# Patient Record
Sex: MALE | Race: WHITE | Employment: OTHER | URBAN - METROPOLITAN AREA
[De-identification: names, ages, dates, MRNs, and addresses within clinical notes are randomized per-mention and may not be internally consistent; named-entity substitution may affect disease eponyms.]

---

## 2017-05-18 ENCOUNTER — HOSPITAL ENCOUNTER (OUTPATIENT)
Dept: GENERAL RADIOLOGY | Age: 77
Discharge: HOME | End: 2017-05-18
Attending: ALLERGY & IMMUNOLOGY
Payer: MEDICARE

## 2017-05-18 DIAGNOSIS — R05.3 CHRONIC COUGH: ICD-10-CM

## 2017-05-18 PROCEDURE — 71020 XR CHEST PA LAT: CPT

## 2017-08-09 ENCOUNTER — ANESTHESIA (OUTPATIENT)
Dept: ENDOSCOPY | Age: 77
End: 2017-08-09
Payer: MEDICARE

## 2017-08-09 ENCOUNTER — ANESTHESIA EVENT (OUTPATIENT)
Dept: ENDOSCOPY | Age: 77
End: 2017-08-09
Payer: MEDICARE

## 2017-08-09 ENCOUNTER — HOSPITAL ENCOUNTER (OUTPATIENT)
Age: 77
Setting detail: OUTPATIENT SURGERY
Discharge: HOME | End: 2017-08-09
Attending: SPECIALIST | Admitting: SPECIALIST
Payer: MEDICARE

## 2017-08-09 VITALS
RESPIRATION RATE: 19 BRPM | OXYGEN SATURATION: 96 % | BODY MASS INDEX: 27.76 KG/M2 | WEIGHT: 198.31 LBS | HEART RATE: 69 BPM | TEMPERATURE: 97.7 F | DIASTOLIC BLOOD PRESSURE: 73 MMHG | SYSTOLIC BLOOD PRESSURE: 166 MMHG | HEIGHT: 71 IN

## 2017-08-09 LAB
H PYLORI FROM TISSUE: NEGATIVE
KIT LOT NO., HCLOLOT: NORMAL
NEGATIVE CONTROL: NEGATIVE
POSITIVE CONTROL: POSITIVE

## 2017-08-09 PROCEDURE — 87077 CULTURE AEROBIC IDENTIFY: CPT | Performed by: SPECIALIST

## 2017-08-09 PROCEDURE — 74011000250 HC RX REV CODE- 250

## 2017-08-09 PROCEDURE — 88312 SPECIAL STAINS GROUP 1: CPT | Performed by: SPECIALIST

## 2017-08-09 PROCEDURE — 76040000019: Performed by: SPECIALIST

## 2017-08-09 PROCEDURE — 76060000031 HC ANESTHESIA FIRST 0.5 HR: Performed by: SPECIALIST

## 2017-08-09 PROCEDURE — 74011250636 HC RX REV CODE- 250/636

## 2017-08-09 PROCEDURE — 88342 IMHCHEM/IMCYTCHM 1ST ANTB: CPT | Performed by: SPECIALIST

## 2017-08-09 PROCEDURE — 88305 TISSUE EXAM BY PATHOLOGIST: CPT | Performed by: SPECIALIST

## 2017-08-09 PROCEDURE — C1726 CATH, BAL DIL, NON-VASCULAR: HCPCS | Performed by: SPECIALIST

## 2017-08-09 PROCEDURE — 77030018712 HC DEV BLLN INFL BSC -B: Performed by: SPECIALIST

## 2017-08-09 PROCEDURE — 77030009426 HC FCPS BIOP ENDOSC BSC -B: Performed by: SPECIALIST

## 2017-08-09 RX ORDER — SODIUM CHLORIDE 0.9 % (FLUSH) 0.9 %
5-10 SYRINGE (ML) INJECTION AS NEEDED
Status: DISCONTINUED | OUTPATIENT
Start: 2017-08-09 | End: 2017-08-09 | Stop reason: HOSPADM

## 2017-08-09 RX ORDER — SODIUM CHLORIDE 0.9 % (FLUSH) 0.9 %
5-10 SYRINGE (ML) INJECTION EVERY 8 HOURS
Status: DISCONTINUED | OUTPATIENT
Start: 2017-08-09 | End: 2017-08-09 | Stop reason: HOSPADM

## 2017-08-09 RX ORDER — SODIUM CHLORIDE 9 MG/ML
50 INJECTION, SOLUTION INTRAVENOUS CONTINUOUS
Status: DISCONTINUED | OUTPATIENT
Start: 2017-08-09 | End: 2017-08-09 | Stop reason: HOSPADM

## 2017-08-09 RX ORDER — PROPOFOL 10 MG/ML
INJECTION, EMULSION INTRAVENOUS AS NEEDED
Status: DISCONTINUED | OUTPATIENT
Start: 2017-08-09 | End: 2017-08-09 | Stop reason: HOSPADM

## 2017-08-09 RX ORDER — FLUMAZENIL 0.1 MG/ML
0.2 INJECTION INTRAVENOUS
Status: DISCONTINUED | OUTPATIENT
Start: 2017-08-09 | End: 2017-08-09 | Stop reason: HOSPADM

## 2017-08-09 RX ORDER — NALOXONE HYDROCHLORIDE 0.4 MG/ML
0.4 INJECTION, SOLUTION INTRAMUSCULAR; INTRAVENOUS; SUBCUTANEOUS
Status: DISCONTINUED | OUTPATIENT
Start: 2017-08-09 | End: 2017-08-09 | Stop reason: HOSPADM

## 2017-08-09 RX ORDER — SODIUM CHLORIDE 9 MG/ML
INJECTION, SOLUTION INTRAVENOUS
Status: DISCONTINUED | OUTPATIENT
Start: 2017-08-09 | End: 2017-08-09 | Stop reason: HOSPADM

## 2017-08-09 RX ORDER — LIDOCAINE HYDROCHLORIDE 20 MG/ML
INJECTION, SOLUTION EPIDURAL; INFILTRATION; INTRACAUDAL; PERINEURAL AS NEEDED
Status: DISCONTINUED | OUTPATIENT
Start: 2017-08-09 | End: 2017-08-09 | Stop reason: HOSPADM

## 2017-08-09 RX ORDER — MIDAZOLAM HYDROCHLORIDE 1 MG/ML
.25-1 INJECTION, SOLUTION INTRAMUSCULAR; INTRAVENOUS
Status: DISCONTINUED | OUTPATIENT
Start: 2017-08-09 | End: 2017-08-09 | Stop reason: HOSPADM

## 2017-08-09 RX ORDER — EPINEPHRINE 0.1 MG/ML
1 INJECTION INTRACARDIAC; INTRAVENOUS
Status: DISCONTINUED | OUTPATIENT
Start: 2017-08-09 | End: 2017-08-09 | Stop reason: HOSPADM

## 2017-08-09 RX ORDER — LORAZEPAM 2 MG/ML
2 INJECTION INTRAMUSCULAR AS NEEDED
Status: DISCONTINUED | OUTPATIENT
Start: 2017-08-09 | End: 2017-08-09 | Stop reason: HOSPADM

## 2017-08-09 RX ORDER — ATROPINE SULFATE 0.1 MG/ML
0.5 INJECTION INTRAVENOUS
Status: DISCONTINUED | OUTPATIENT
Start: 2017-08-09 | End: 2017-08-09 | Stop reason: HOSPADM

## 2017-08-09 RX ORDER — FENTANYL CITRATE 50 UG/ML
200 INJECTION, SOLUTION INTRAMUSCULAR; INTRAVENOUS
Status: DISCONTINUED | OUTPATIENT
Start: 2017-08-09 | End: 2017-08-09 | Stop reason: HOSPADM

## 2017-08-09 RX ORDER — DEXTROMETHORPHAN/PSEUDOEPHED 2.5-7.5/.8
1.2 DROPS ORAL
Status: DISCONTINUED | OUTPATIENT
Start: 2017-08-09 | End: 2017-08-09 | Stop reason: HOSPADM

## 2017-08-09 RX ADMIN — PROPOFOL 30 MG: 10 INJECTION, EMULSION INTRAVENOUS at 07:53

## 2017-08-09 RX ADMIN — PROPOFOL 50 MG: 10 INJECTION, EMULSION INTRAVENOUS at 07:42

## 2017-08-09 RX ADMIN — PROPOFOL 40 MG: 10 INJECTION, EMULSION INTRAVENOUS at 07:56

## 2017-08-09 RX ADMIN — LIDOCAINE HYDROCHLORIDE 20 MG: 20 INJECTION, SOLUTION EPIDURAL; INFILTRATION; INTRACAUDAL; PERINEURAL at 07:53

## 2017-08-09 RX ADMIN — PROPOFOL 50 MG: 10 INJECTION, EMULSION INTRAVENOUS at 07:51

## 2017-08-09 RX ADMIN — SODIUM CHLORIDE: 9 INJECTION, SOLUTION INTRAVENOUS at 07:30

## 2017-08-09 RX ADMIN — PROPOFOL 50 MG: 10 INJECTION, EMULSION INTRAVENOUS at 07:47

## 2017-08-09 RX ADMIN — PROPOFOL 100 MG: 10 INJECTION, EMULSION INTRAVENOUS at 07:39

## 2017-08-09 NOTE — IP AVS SNAPSHOT
[address] 
[phone] Patient: Ze Iqbal MRN: [id number] [facility]:6/58/2122 You are allergic to the following Allergen Reactions Pseudoephedrine Unknown (comments) Pt unaware of this. Recent Documentation Height Weight BMI Smoking Status 1.803 m 90 kg 27.66 kg/m2 Never Smoker Emergency Contacts Name Discharge Info Relation Home Work Mobile Madhavi Blakely DISCHARGE CAREGIVER [3] Spouse [3] [phone] About your hospitalization You were admitted on:  August 9, 2017 You last received care in the:  [facility] ENDOSCOPY You were discharged on:  August 9, 2017 Unit phone number:  [phone] Why you were hospitalized Your primary diagnosis was:  Not on File Providers Seen During Your Hospitalizations Provider Role Specialty Primary office phone Brice Farrell MD Attending Provider Gastroenterology [phone] Your Primary Care Physician (PCP) Primary Care Physician Office Phone Office Fax [facility] [phone] Follow-up Information None Your Appointments Tuesday August 15, 2017 10:30 AM EDT  
(Arrive by 10:00 AM) ECHO with ECHO LAB 1 [facility] NON-INVASIVE CARD ([address]) [address]  
[phone] Please be prepared to remove everything from the waist up and put on a gown. Please report to [facility] main lobby to [place] at [address] [address], [address]. Please arrive 30 mintues prior to appointment unless appointment prep instructions indicate otherwise. Please fax any paper scripts or orders to [phone]. Current Discharge Medication List  
  
CONTINUE these medications which have NOT CHANGED Dose & Instructions Dispensing Information Comments Morning Noon Evening Bedtime ADVAIR DISKUS 250-50 mcg/dose diskus inhaler Generic drug:  fluticasone-salmeterol Your last dose was: Your next dose is:    
   
   
 Dose:  1 Puff Take 1 Puff by inhalation two (2) times a day. Refills:  0  
     
   
   
   
  
 betamethasone dipropionate 0.05 % ointment Commonly known as:  Ramires Bernard Your last dose was: Your next dose is:    
   
   
 Apply  to affected area as needed for Skin Irritation. Refills:  0 CARDIZEM  mg ER capsule Generic drug:  dilTIAZem CD Your last dose was: Your next dose is:    
   
   
 Dose:  180 mg Take 180 mg by mouth daily. Refills:  0 CeleBREX 200 mg capsule Generic drug:  celecoxib Your last dose was: Your next dose is:    
   
   
 Dose:  200 mg Take 200 mg by mouth daily. Refills:  0  
     
   
   
   
  
 hydroCHLOROthiazide 25 mg tablet Commonly known as:  HYDRODIURIL Your last dose was: Your next dose is:    
   
   
 Dose:  25 mg Take 1 Tab by mouth daily. Quantity:  90 Tab Refills:  3 MULTIVITAMIN PO Your last dose was: Your next dose is: Take  by mouth. Takes one po daily. Refills:  0 OMEGA 3 FISH OIL PO Your last dose was: Your next dose is: Take  by mouth. Takes one po daily. Refills:  0 Discharge Instructions Evans Mullen [id number] 1940 EGD DISCHARGE INSTRUCTIONS Discomfort: 
Sore throat- throat lozenges or warm salt water gargle 
redness at IV site- apply warm compress to area; if redness or soreness persist- contact your physician Gaseous discomfort- walking, belching will help relieve any discomfort You may not operate a vehicle for 12 hours You may not engage in an occupation involving machinery or appliances for rest of today. You may not drink alcoholic beverages for at least 12 hours Avoid making any critical decisions for at least 24 hour DIET You may resume your regular diet  however -  remember your colon is empty and a heavy meal will produce gas. Avoid these foods:  vegetables, fried / greasy foods, carbonated drinks ACTIVITY You may resume your normal daily activities. Spend the remainder of the day resting -  avoid any strenuous activity. CALL M.D. ANY SIGN OF Increasing pain, nausea, vomiting Abdominal distension (swelling) New increased bleeding (oral or rectal) Fever (chills) Pain in chest area Bloody discharge from nose or mouth Shortness of breath You may not take any Advil, Aspirin, Ibuprofen, Motrin, Aleve, or Goodys unless MD recommended, ONLY  Tylenol as needed for pain. Follow-up Instructions: 
 Call Dr. Adolph Elena office to make appointment for follow up in 2 months Office telephone for problems or questions [phone] Results of procedure / biopsy in 10-14 days Start omeprazole sent into pharmacy Discharge Orders None Introducing [facility]! [employer] introduces [employer] patient portal. Now you can access parts of your medical record, email your doctor's office, and request medication refills online. 1. In your internet browser, go to [email or web address] 2. Click on the First Time User? Click Here link in the Sign In box. You will see the New Member Sign Up page. 3. Enter your [employer] Access Code exactly as it appears below. You will not need to use this code after youve completed the sign-up process. If you do not sign up before the expiration date, you must request a new code. · [employer] Access Code: [email or web address] Expires: 11/7/2017  6:29 AM 
 
4.  Enter the last four digits of your Social Security Number (xxxx) and Date of Birth (mm/dd/yyyy) as indicated and click Submit. You will be taken to the next sign-up page. 5. Create a [employer] ID. This will be your [employer] login ID and cannot be changed, so think of one that is secure and easy to remember. 6. Create a [employer] password. You can change your password at any time. 7. Enter your Password Reset Question and Answer. This can be used at a later time if you forget your password. 8. Enter your e-mail address. You will receive e-mail notification when new information is available in [address] 9. Click Sign Up. You can now view and download portions of your medical record. 10. Click the Download Summary menu link to download a portable copy of your medical information. If you have questions, please visit the Frequently Asked Questions section of the [employer] website. Remember, [employer] is NOT to be used for urgent needs. For medical emergencies, dial 911. Now available from your iPhone and Android! General Information Please provide this summary of care documentation to your next provider. Patient Signature:  ____________________________________________________________ Date:  ____________________________________________________________  
  
[facility] Provider Signature:  ____________________________________________________________ Date:  ____________________________________________________________

## 2017-08-09 NOTE — ANESTHESIA PREPROCEDURE EVALUATION
Anesthetic History   No history of anesthetic complications            Review of Systems / Medical History  Patient summary reviewed, nursing notes reviewed and pertinent labs reviewed    Pulmonary  Within defined limits          Asthma        Neuro/Psych   Within defined limits           Cardiovascular  Within defined limits  Hypertension                   GI/Hepatic/Renal  Within defined limits         Liver disease     Endo/Other  Within defined limits           Other Findings              Physical Exam    Airway  Mallampati: II  TM Distance: > 6 cm  Neck ROM: normal range of motion   Mouth opening: Normal     Cardiovascular  Regular rate and rhythm,  S1 and S2 normal,  no murmur, click, rub, or gallop             Dental    Dentition: Caps/crowns     Pulmonary  Breath sounds clear to auscultation               Abdominal  GI exam deferred       Other Findings            Anesthetic Plan    ASA: 2  Anesthesia type: MAC          Induction: Intravenous  Anesthetic plan and risks discussed with: Patient

## 2017-08-09 NOTE — ROUTINE PROCESS
Teodora Saint Joseph  1940  [id number]    Situation:  Verbal report received from:  Doug Ochoa RN  Procedure: Procedure(s):  ESOPHAGOGASTRODUODENOSCOPY (EGD) WITH BOTOX  ESOPHAGEAL DILATION  ESOPHAGOGASTRODUODENAL (EGD) BIOPSY    Background:    Preoperative diagnosis: ASTHMA, DYSPHAGIA  Postoperative diagnosis: Erosive esophagitis  Hemorragic Gastritis    :  Dr. Tena Reed  Assistant(s): Endoscopy Technician-1: Rashida James  Endoscopy RN-1: Jose Haas RN    Specimens:   ID Type Source Tests Collected by Time Destination   1 : antrum Preservative Gastric  Cecelia Frank MD 8/9/2017 5490 Pathology   2 : body Preservative Gastric  Cecelia Frank MD 8/9/2017 0745 Pathology   3 : ge junction Preservative GE Junction  Cecelia Frank MD 8/9/2017 1428 Pathology   4 : Lower esophagys Preservative Esophagus, Distal  Cecelia Frank MD 8/9/2017 2130 Pathology   5 : mid esophagus Preservative Esophagus, Mid  Cecelia Frank MD 8/9/2017 7977 Pathology   6 : upper esophagus Preservative Esophagus, Proximal  Cecelia Frank MD 8/9/2017 1587 Pathology     H. Pylori  yes    Assessment:  Intra-procedure medications   Anesthesia gave intra-procedure sedation and medications, see anesthesia flow sheet yes    Intravenous fluids: NS@ KVO     Vital signs stable     Abdominal assessment: round and soft     Recommendation:  Discharge patient per MD order.   Family  Permission to share finding with family or friend yes

## 2017-08-09 NOTE — ANESTHESIA POSTPROCEDURE EVALUATION
Post-Anesthesia Evaluation and Assessment    Patient: Kishor Zuñiga MRN: [id number]  SSN: [id number]    YOB: 1940  Age: 69 y.o. Sex: male       Cardiovascular Function/Vital Signs  Visit Vitals    /58    Pulse 64    Temp 36.7 °C (98 °F)    Resp 15    Ht 5' 11\" (1.803 m)    Wt 90 kg (198 lb 5 oz)    SpO2 99%    BMI 27.66 kg/m2       Patient is status post MAC anesthesia for Procedure(s):  ESOPHAGOGASTRODUODENOSCOPY (EGD) WITH BOTOX  ESOPHAGEAL DILATION  ESOPHAGOGASTRODUODENAL (EGD) BIOPSY. Nausea/Vomiting: None    Postoperative hydration reviewed and adequate. Pain:  Pain Scale 1: Numeric (0 - 10) (08/09/17 0729)  Pain Intensity 1: 0 (08/09/17 0729)   Managed    Neurological Status: At baseline    Mental Status and Level of Consciousness: Arousable    Pulmonary Status:   O2 Device: Room air (08/09/17 0801)   Adequate oxygenation and airway patent    Complications related to anesthesia: None    Post-anesthesia assessment completed.  No concerns    Signed By: Corinne Eans., MD     August 9, 2017

## 2017-08-09 NOTE — H&P
Pre-endoscopy H and P    The patient was seen and examined. The airway was assessed and documented. The problem list, past medical history, and medications were reviewed. Patient Active Problem List   Diagnosis Code    Hypertension I10    Asthma J45.909    Hyperlipemia E78.5     Social History     Social History    Marital status:      Spouse name: N/A    Number of children: N/A    Years of education: N/A     Occupational History    Not on file. Social History Main Topics    Smoking status: Never Smoker    Smokeless tobacco: Never Used    Alcohol use 11.5 oz/week     2 Glasses of wine, 21 Standard drinks or equivalent per week      Comment: everyday    Drug use: Yes    Sexual activity: Yes     Partners: Female     Other Topics Concern    Not on file     Social History Narrative     Past Medical History:   Diagnosis Date    Arrhythmia     \"double beat sometimes\" - no problem - was evaluated    Asthma     Cancer ([facility] [address]     prostate - proton radiation    Hypertension     Ill-defined condition     lyme's disease    Ill-defined condition     rectal irritation from proton radiation    Liver disease     h/o hepatitis 50 yrs ago - ?type     The patient has a family history of na    Prior to Admission Medications   Prescriptions Last Dose Informant Patient Reported? Taking? MULTIVITAMIN PO 8/8/2017 at Unknown time  Yes Yes   Sig: Take  by mouth. Takes one po daily. OMEGA-3 FATTY ACIDS/FISH OIL (OMEGA 3 FISH OIL PO) 8/8/2017 at Unknown time  Yes Yes   Sig: Take  by mouth. Takes one po daily. betamethasone dipropionate (DIPROLENE) 0.05 % ointment   Yes No   Sig: Apply  to affected area as needed for Skin Irritation. celecoxib (CELEBREX) 200 mg capsule 8/8/2017 at Unknown time  Yes Yes   Sig: Take 200 mg by mouth daily. diltiazem CD (CARDIZEM CD) 180 mg ER capsule 8/8/2017 at Unknown time  Yes Yes   Sig: Take 180 mg by mouth daily.    fluticasone-salmeterol (ADVAIR DISKUS) 250-50 mcg/dose diskus inhaler 8/9/2017 at Unknown time  Yes Yes   Sig: Take 1 Puff by inhalation two (2) times a day. hydrochlorothiazide (HYDRODIURIL) 25 mg tablet 8/8/2017 at Unknown time  No Yes   Sig: Take 1 Tab by mouth daily. Facility-Administered Medications: None         The review of systems is:  negative for shortness of breath or chest pain      The heart, lungs and mental status were satisfactory for the administration of MAC sedation and for the procedure. Mallampati score: See Anesthesia. I discussed with the patient the objectives, risks, consequences and alternatives to the procedure. Plan: Endoscopic procedure with MAC sedation.     Keiry Porter MD  8/9/2017  7:22 AM

## 2017-08-09 NOTE — DISCHARGE INSTRUCTIONS
Alondra Tapia  [id number]  1940    EGD DISCHARGE INSTRUCTIONS  Discomfort:  Sore throat- throat lozenges or warm salt water gargle  redness at IV site- apply warm compress to area; if redness or soreness persist- contact your physician  Gaseous discomfort- walking, belching will help relieve any discomfort  You may not operate a vehicle for 12 hours  You may not engage in an occupation involving machinery or appliances for rest of today. You may not drink alcoholic beverages for at least 12 hours  Avoid making any critical decisions for at least 24 hour  DIET  You may resume your regular diet - however -  remember your colon is empty and a heavy meal will produce gas. Avoid these foods:  vegetables, fried / greasy foods, carbonated drinks  ACTIVITY  You may resume your normal daily activities. Spend the remainder of the day resting -  avoid any strenuous activity. CALL M.D. ANY SIGN OF   Increasing pain, nausea, vomiting  Abdominal distension (swelling)  New increased bleeding (oral or rectal)  Fever (chills)  Pain in chest area  Bloody discharge from nose or mouth  Shortness of breath    You may not take any Advil, Aspirin, Ibuprofen, Motrin, Aleve, or Goodys unless MD recommended, ONLY  Tylenol as needed for pain.     Follow-up Instructions:   Call Dr. Rodriguez Peak office to make appointment for follow up in 2 months  Office telephone for problems or questions [phone]  Results of procedure / biopsy in 10-14 days   Start omeprazole sent into pharmacy

## 2017-08-10 NOTE — PROCEDURES
[address]     Endoscopic Gastroduodenoscopy Procedure Note    Jailyn Escalera  1940  [id number]    Procedure: Endoscopic Gastroduodenoscopy with biopsy    Indication:  Dyphagia/odynophagia, GERD     Preoperative diagnosis: ASTHMA, DYSPHAGIA    Postoperative diagnosis: Erosive esophagitis  Mild Hemorragic Gastritis    : Rosemary Trujillo MD    Assistant:  Endoscopy Technician-1: Riley Martino  Endoscopy RN-1: Ewelina Hackett RN    Referring Provider:  Selene Lal MD      Anesthesia/Sedation: MAC      Procedure Details     After infomed consent was obtained for the procedure, with all risks and benefits of procedure explained the patient was taken to the endoscopy suite and placed in the left lateral decubitus position. Following sequential administration of sedation as per above, the endoscope was inserted into the mouth and advanced under direct vision to esophagus. A careful inspection was made as the gastroscope was withdrawn, including a retroflexed view of the proximal stomach; findings and interventions are described below. Findings:   Esophagus:LA Class B erosive esophagitis just above the GEJ - Bx, Schatzki's Ring - TTS Balloon 12-18 mm mildly tight at 18 mm; no endoscopic evidence for EoE but Bx at lower, mid and upper areas  Stomach: mild patchy hemorrhagic gastritis Bx for path from antrum and body and Pyloritek  Duodenum/jejunum: normal    Therapies:  See above    Specimens: see above           EBL: None    Complications:   None; patient tolerated the procedure well. Recommendations:  -Acid suppression with a proton pump inhibitor omeprazole 40 mg., -Await pathology. , -Await ADRY test result and treat for Helicobacter pylori if positive. , -Follow up with me., -No NSAIDS    Rosemary Trujillo MD

## 2017-08-15 ENCOUNTER — HOSPITAL ENCOUNTER (OUTPATIENT)
Dept: NON INVASIVE DIAGNOSTICS | Age: 77
Discharge: HOME | End: 2017-08-15
Attending: INTERNAL MEDICINE
Payer: MEDICARE

## 2017-08-15 DIAGNOSIS — I34.0 NONRHEUMATIC MITRAL (VALVE) INSUFFICIENCY: ICD-10-CM

## 2017-08-15 PROCEDURE — 93306 TTE W/DOPPLER COMPLETE: CPT

## 2022-11-23 ENCOUNTER — TELEPHONE (OUTPATIENT)
Dept: CARDIOLOGY CLINIC | Age: 82
End: 2022-11-23

## 2022-11-23 NOTE — TELEPHONE ENCOUNTER
Called patient, no answer.  identifies patient by full name. Message left that per Dr. Cathleen Dunne request  he would like to have patient seen early than  1/19/22. Dr. Lee Matson would like to see patient on Friday 12/2/22 @ 10 am @ the [facility].  Patient advised to call the office at [phone] to confirm that he is available  on 12/2/22 @ 10 am.

## 2022-11-28 NOTE — TELEPHONE ENCOUNTER
Received call from patient, ID verified using two patient identifiers. [place] 2 [place]. Per Dr. Yonny Silva patient needs to be seen sooner than 1/19/22. Moved patient to Friday 12/2/22 @ 10 am @ the [facility]. Patient verbalizes understanding of date, time and location of appointment.      Future Appointments   Date Time Provider Rosy Enciso   12/2/2022 10:00 AM Lexii Giles MD [facility]

## 2022-12-01 PROBLEM — Z79.899 HIGH RISK MEDICATION USE: Status: ACTIVE | Noted: 2022-12-01

## 2022-12-01 PROBLEM — I49.3 FREQUENT PVCS: Status: ACTIVE | Noted: 2022-12-01

## 2022-12-01 NOTE — PROGRESS NOTES
Cardiac Electrophysiology OFFICE Consultation Note       Assessment/Plan:   1. Frequent PVCs  -     AMB POC EKG ROUTINE W/ 12 LEADS, INTER & REP  -     CARDIAC HOLTER MONITOR; Future  -     XR CHEST PA LAT; Future  -     HEPATIC FUNCTION PANEL; Future  -     TSH 3RD GENERATION; Future  2. High risk medication use  -     CARDIAC HOLTER MONITOR; Future  -     XR CHEST PA LAT; Future  -     HEPATIC FUNCTION PANEL; Future  -     TSH 3RD GENERATION; Future  3. Hyperlipidemia, unspecified hyperlipidemia type  -     CARDIAC HOLTER MONITOR; Future  -     XR CHEST PA LAT; Future  -     HEPATIC FUNCTION PANEL; Future  -     TSH 3RD GENERATION; Future  4. Primary hypertension  -     CARDIAC HOLTER MONITOR; Future  -     XR CHEST PA LAT; Future  -     HEPATIC FUNCTION PANEL; Future  -     TSH 3RD GENERATION; Future  5. Uncomplicated asthma, unspecified asthma severity, unspecified whether persistent  -     CARDIAC HOLTER MONITOR; Future  -     XR CHEST PA LAT; Future  -     HEPATIC FUNCTION PANEL; Future  -     TSH 3RD GENERATION; Future     Frequent PVCs  History of symptomatic frequent PVCs. Prior ventricular bigeminy and trigeminy with burden 12.2% in September 2020. Now less than 1% on amiodarone. EKG today demonstrated no evidence of PVCs. - We spoke in great detail again regarding the theoretical risk of amiodarone use chronically versus the risk of ablation therapy. He has no functional limitations and appears to have good quality life at this time. We trialed off of amiodarone due to history of photosensitivity but his PVC resumed. - Continue low-dose amiodarone 100 mg q.  OD  - Repeat 48-hour Holter monitor in 5 months  - Check liver function tests, thyroid tests and chest x-ray in 5 months  - Last echocardiogram from 7/14/2021: EF 55 to 60%, mild LVH, calcified aortic valve with mild aortic stenosis, mild mitral and tricuspid regurgitation  - Follow-up with me in 6 months    High risk med management  - We discussed in great detail regarding high risk medication management and the associated risks of antiarrhythmic therapy. Patient reports full understanding of recommendations.  -Risks associated with Amiodarone including but not limited to risks of thyroid toxicity, liver toxicity, and lung injury was explained to the patient including the need for long term monitoring with labs and imaging.  - EKG demonstrated QTc of 421 ms  - obtain LFTs and TSH every 6 months  -Annual chest x-ray    Hypertension  BP is well controlled on the current regimen. No change in antihypertensive medications today. Recommended routine exercise and low sodium diet. - Continue diltiazem    Mild aortic stenosis  Stable mild aortic stenosis with mild aortic regurgitation. Normal LVEF with LVH on 7/14/2021    Mixed hyperlipidemia  Tolerating atorvastatin    Subjective:       Aric Houser is a 81 y.o. patient who is seen for evaluation of  PVCs and high risk medication management. Prior patient of [facility]. Would like to transition his care to [employer]. History of frequent symptomatic PVCs managed on amiodarone. [place] previously 12.2% decrease down to less than 1% on amiodarone. 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 Works out with a  closely. Denies any chest pain, lower extremity edema, orthopnea or PND. Beta-blocker therapy is limited by history of asthma.   EKG today demonstrated normal sinus rhythm without PVCs currently on amiodarone 100 mg q. OD.    TSH 11/23/22: 2.47    Patient Active Problem List   Diagnosis Code    Hypertension I10    Asthma J45.909    Hyperlipemia E78.5    Frequent PVCs I49.3    High risk medication use Z79.899     Current Outpatient Medications   Medication Sig Dispense Refill    atorvastatin (LIPITOR) 10 mg tablet       bumetanide (BUMEX) 1 mg tablet bumetanide 1 mg tablet      doxazosin (CARDURA) 8 mg tablet       Dupixent Syringe 300 mg/2 mL syrg syringe       EPINEPHrine (EPIPEN) 0.3 mg/0.3 mL injection       fluocinoNIDE (LIDEX) 0.05 % topical cream as needed. Breo Ellipta 100-25 mcg/dose inhaler INHALE ONE PUFF DAILY. hydrocortisone (ANUSOL-HC) 2.5 % rectal cream       olmesartan (BENICAR) 40 mg tablet       amiodarone (CORDARONE) 200 mg tablet Take 100 mg by mouth. 100mg every other day      sulfacetamide sodium 10 % topical cream Apply  to affected area two (2) times a day. dilTIAZem ER (CARDIZEM CD) 180 mg capsule Take 180 mg by mouth daily. OMEGA-3 FATTY ACIDS/FISH OIL (OMEGA 3 FISH OIL PO) Take  by mouth. Takes one po daily. MULTIVITAMIN PO Take  by mouth. Takes one po daily. celecoxib (CELEBREX) 200 mg capsule Take 200 mg by mouth daily. betamethasone dipropionate (DIPROLENE) 0.05 % ointment Apply  to affected area as needed for Skin Irritation. (Patient not taking: Reported on 12/2/2022)      hydrochlorothiazide (HYDRODIURIL) 25 mg tablet Take 1 Tab by mouth daily. (Patient not taking: Reported on 12/2/2022) 90 Tab 3    fluticasone propion-salmeteroL (ADVAIR/WIXELA) 250-50 mcg/dose diskus inhaler Take 1 Puff by inhalation two (2) times a day. (Patient not taking: Reported on 12/2/2022)       Allergies   Allergen Reactions    Pseudoephedrine Unknown (comments)     Pt unaware of this.      Past Medical History:   Diagnosis Date    Arrhythmia     \"double beat sometimes\" - no problem - was evaluated    Asthma     Cancer ([facility] [address]     prostate - proton radiation    Hypertension     Ill-defined condition     lyme's disease    Ill-defined condition     rectal irritation from proton radiation    Liver disease     h/o hepatitis 50 yrs ago - ?type     Past Surgical History:   Procedure Laterality Date    HX HEENT      T & A    HX ORTHOPAEDIC      hand surgery for bent finger     Family History   Problem Relation Age of Onset    Heart Disease Mother         CVA    Cancer Father         prostate/melanoma     Social History     Tobacco Use    Smoking status: Never    Smokeless tobacco: Never   Substance Use Topics    Alcohol use: Yes     Alcohol/week: 35.0 standard drinks     Types: 7 Glasses of wine, 7 Cans of beer, 21 Standard drinks or equivalent per week     Comment: everyday        Review of Systems:   12 point review of systems was performed. All negative except for HPI     Objective:   /80 (BP 1 Location: Left upper arm, BP Patient Position: Sitting, BP Cuff Size: Adult)   Pulse 80   Resp 18   Ht 5' 11\" (1.803 m)   Wt 200 lb (90.7 kg)   SpO2 97%   BMI 27.89 kg/m²     Physical Exam:   General:  Alert and oriented, in no acute distress  Head:  Atraumatic, normocephalic  Eyes:  extraocular muscles intact  Neck:  Supple, normal range of motion  Lungs:  Clear to auscultation bilaterally, no wheezes/rales/rhonchi   Cardiovascular:  Regular rate and rhythm, normal S1-S2, no murmurs/rubs/gallops  Abdomen:  Soft, nontender, nondistended, normoactive bowel sounds  Skin:  Intact, no rash  Extremities:, no clubbing, cyanosis, or edema  Musculoskeletal: normal range of motion  Neurological:  Alert and oriented, no focal neurologic deficits  Psychiatric:  Normal mood and affect    No results found for: HBA1C, QBD4EPLB, ENG7XRAA, YAK2BUXL  EKG: Sinus rhythm with QTC of 421 ms, no PVCs      01/08/15    NM CARDIAC SPECT W STRS/REST MULT 01/08/2015 1/8/2015    Narrative  **Final Report**      ICD Codes / Adm. Diagnosis: 427.89  786.09 / Other specified cardiac dysrhy  Other dyspnea and respirator  Examination:  [facility]  - [id number] - Jan 8 2015 10:26AM  Accession No:  [id number]  Reason:  bigeminal rhythm, dyspnea with exertion      REPORT:  EXAM:  NM CARD SPECT MULT WALL EF    INDICATION: bigeminal rhythm, dyspnea with exertion, hypertension    COMPARISON: None    TRACER: Tc 99m sestamibi    TECHNIQUE:  Resting SPECT images of the heart were obtained following the  uneventful intravenous administration of 10.9 mCi of Tc 99m sestamibi.     Gated stress SPECT images of the heart were obtained following Prateek  exercise protocol and the uneventful intravenous administration of 32.7 mCi  of Tc 99m sestamibi. FINDINGS:  The rest and stress perfusion images demonstrate no significant  perfusion defect or evidence of myocardial reversibility. The gated images demonstrate normal global and regional wall motion and  thickening. Left ventricular ejection fraction is 58 %. Impression  :  Normal gated rest/stress myocardial perfusion imaging study. No evidence of  myocardial ischemia or infarction. Left ventricular ejection fraction is 58%. Signing/Reading Doctor: Chio Mcallister ([id number])  Approved: Chio Mcallister ([id number])  Jan 8 2015 11:27AM    Signed by: Nu Cooper MD on 1/8/2015 11:27 AM    No results found for this or any previous visit. Thank you for involving me in this patient's care and please call with further concerns or questions. ________________________________________  An Lissette Mercedes MD, [facility]  Cardiac Electrophysiology  [facility]  [address] [place] [address]                             [phone]     [address] [place] [address].  [address] [place] [address], [id number] [facility]  [phone]

## 2022-12-02 ENCOUNTER — OFFICE VISIT (OUTPATIENT)
Dept: CARDIOLOGY CLINIC | Age: 82
End: 2022-12-02
Payer: MEDICARE

## 2022-12-02 VITALS
HEART RATE: 80 BPM | SYSTOLIC BLOOD PRESSURE: 122 MMHG | RESPIRATION RATE: 18 BRPM | WEIGHT: 200 LBS | OXYGEN SATURATION: 97 % | HEIGHT: 71 IN | DIASTOLIC BLOOD PRESSURE: 80 MMHG | BODY MASS INDEX: 28 KG/M2

## 2022-12-02 DIAGNOSIS — Z79.899 HIGH RISK MEDICATION USE: ICD-10-CM

## 2022-12-02 DIAGNOSIS — J45.909 UNCOMPLICATED ASTHMA, UNSPECIFIED ASTHMA SEVERITY, UNSPECIFIED WHETHER PERSISTENT: ICD-10-CM

## 2022-12-02 DIAGNOSIS — I49.3 FREQUENT PVCS: Primary | ICD-10-CM

## 2022-12-02 DIAGNOSIS — I10 PRIMARY HYPERTENSION: ICD-10-CM

## 2022-12-02 DIAGNOSIS — E78.5 HYPERLIPIDEMIA, UNSPECIFIED HYPERLIPIDEMIA TYPE: ICD-10-CM

## 2022-12-02 RX ORDER — ATORVASTATIN CALCIUM 10 MG/1
TABLET, FILM COATED ORAL
COMMUNITY
Start: 2022-09-13

## 2022-12-02 RX ORDER — FLUOCINONIDE 0.5 MG/G
CREAM TOPICAL AS NEEDED
COMMUNITY
Start: 2022-10-12

## 2022-12-02 RX ORDER — DOXAZOSIN 8 MG/1
TABLET ORAL
COMMUNITY
Start: 2022-09-13

## 2022-12-02 RX ORDER — EPINEPHRINE 0.3 MG/.3ML
INJECTION SUBCUTANEOUS
COMMUNITY
Start: 2022-10-06

## 2022-12-02 RX ORDER — DUPILUMAB 300 MG/2ML
INJECTION, SOLUTION SUBCUTANEOUS
COMMUNITY
Start: 2022-11-21

## 2022-12-02 RX ORDER — FLUTICASONE FUROATE AND VILANTEROL TRIFENATATE 100; 25 UG/1; UG/1
POWDER RESPIRATORY (INHALATION)
COMMUNITY
Start: 2022-11-22

## 2022-12-02 RX ORDER — AMIODARONE HYDROCHLORIDE 200 MG/1
100 TABLET ORAL
COMMUNITY

## 2022-12-02 RX ORDER — OLMESARTAN MEDOXOMIL 40 MG/1
TABLET ORAL
COMMUNITY
Start: 2022-09-13

## 2022-12-02 RX ORDER — HYDROCORTISONE 25 MG/G
CREAM TOPICAL
COMMUNITY
Start: 2022-10-12

## 2022-12-02 RX ORDER — BUMETANIDE 1 MG/1
TABLET ORAL
COMMUNITY

## 2022-12-02 NOTE — PATIENT INSTRUCTIONS
Continue Amiodarone 100 mg every other day  Check 48 hour Holter monitor in 5 months  Check Liver function test, Thyroid test in 6 months  Check Chest X-ray in 5-6 months  FU with Dr. Inell Opitz in 6 month

## 2023-05-05 ENCOUNTER — CLINICAL SUPPORT (OUTPATIENT)
Dept: CARDIOLOGY CLINIC | Age: 83
End: 2023-05-05

## 2023-05-05 ENCOUNTER — PATIENT MESSAGE (OUTPATIENT)
Dept: CARDIOLOGY CLINIC | Age: 83
End: 2023-05-05

## 2023-05-05 ENCOUNTER — HOSPITAL ENCOUNTER (OUTPATIENT)
Dept: GENERAL RADIOLOGY | Age: 83
End: 2023-05-05
Attending: INTERNAL MEDICINE
Payer: MEDICARE

## 2023-05-05 DIAGNOSIS — I10 PRIMARY HYPERTENSION: ICD-10-CM

## 2023-05-05 DIAGNOSIS — Z79.899 HIGH RISK MEDICATION USE: ICD-10-CM

## 2023-05-05 DIAGNOSIS — I49.3 FREQUENT PVCS: ICD-10-CM

## 2023-05-05 DIAGNOSIS — J45.909 UNCOMPLICATED ASTHMA, UNSPECIFIED ASTHMA SEVERITY, UNSPECIFIED WHETHER PERSISTENT: ICD-10-CM

## 2023-05-05 DIAGNOSIS — E78.5 HYPERLIPIDEMIA, UNSPECIFIED HYPERLIPIDEMIA TYPE: ICD-10-CM

## 2023-05-05 PROCEDURE — 71046 X-RAY EXAM CHEST 2 VIEWS: CPT

## 2023-05-11 ENCOUNTER — PATIENT MESSAGE (OUTPATIENT)
Age: 83
End: 2023-05-11

## 2023-05-11 NOTE — TELEPHONE ENCOUNTER
48-hour Holter monitor  Predominant sinus rhythm with heart rate range between 50 to 114 bpm, average heart rate 61 bpm  Frequent PACs of 14.3%  Occasional PVCs of 1.8%  No evidence of atrial fibrillation or atrial flutter  5 pauses noted longest lasting 2 seconds at 4:41 AM on 5/6/2023  No patient events reported

## 2023-06-05 NOTE — PROGRESS NOTES
(Site: Left Upper Arm, Position: Sitting, Cuff Size: Medium Adult)   Pulse 72   Ht 5' 11\" (1.803 m)   Wt 201 lb (91.2 kg)   SpO2 96%   BMI 28.03 kg/m²      PHYSICAL EXAM  General:  Alert and oriented, in no acute distress  Head:  Atraumatic, normocephalic  Eyes:  extraocular muscles intact  Neck:  Supple, normal range of motion  Lungs:  Clear to auscultation bilaterally, no wheezes/rales/rhonchi   Cardiovascular:  Regular rate and rhythm, normal S1-S2, no murmurs/rubs/gallops  Abdomen:  Soft, nontender, nondistended, normoactive bowel sounds  Skin:  Intact, no rash  Extremities:, no clubbing, cyanosis, or edema  Musculoskeletal: normal range of motion  Neurological:  Alert and oriented, no focal neurologic deficits  Psychiatric:  Normal mood and affect    EKG: Sinus, rate of 57 bpm, normal axis, Qtc 435 ms      Medications:     Current Outpatient Medications   Medication Sig    doxycycline hyclate (VIBRAMYCIN) 100 MG capsule doxycycline hyclate 100 mg capsule    Ivermectin 1 % CREA ivermectin 1 % topical cream    Multiple Vitamin (MULTIVITAMIN PO) Take by mouth    amiodarone (CORDARONE) 200 MG tablet Take 0.5 tablets by mouth    atorvastatin (LIPITOR) 10 MG tablet ceived the following from [facility]: Outside name: atorvastatin (LIPITOR) 10 mg tablet    betamethasone dipropionate 0.05 % ointment Apply topically as needed    bumetanide (BUMEX) 1 MG tablet bumetanide 1 mg tablet    celecoxib (CELEBREX) 200 MG capsule Take 1 capsule by mouth daily    doxazosin (CARDURA) 8 MG tablet ceived the following from [facility]: Outside name: doxazosin (CARDURA) 8 mg tablet    dupilumab (DUPIXENT) 300 MG/2ML SOSY injection ceived the following from [facility]: Outside name: Dupixent Syringe 300 mg/2 mL syrg syringe    EPINEPHrine (EPIPEN) 0.3 MG/0.3ML SOAJ injection ceived the following from [facility]: Outside name: EPINEPHrine (EPIPEN) 0.3 mg/0.3 mL

## 2023-06-06 ENCOUNTER — OFFICE VISIT (OUTPATIENT)
Age: 83
End: 2023-06-06
Payer: MEDICARE

## 2023-06-06 VITALS
SYSTOLIC BLOOD PRESSURE: 138 MMHG | HEART RATE: 72 BPM | HEIGHT: 71 IN | BODY MASS INDEX: 28.14 KG/M2 | DIASTOLIC BLOOD PRESSURE: 70 MMHG | WEIGHT: 201 LBS | OXYGEN SATURATION: 96 %

## 2023-06-06 DIAGNOSIS — I10 PRIMARY HYPERTENSION: ICD-10-CM

## 2023-06-06 DIAGNOSIS — J45.909 UNCOMPLICATED ASTHMA, UNSPECIFIED ASTHMA SEVERITY, UNSPECIFIED WHETHER PERSISTENT: ICD-10-CM

## 2023-06-06 DIAGNOSIS — E78.5 HYPERLIPIDEMIA, UNSPECIFIED HYPERLIPIDEMIA TYPE: ICD-10-CM

## 2023-06-06 DIAGNOSIS — I49.3 FREQUENT PVCS: Primary | ICD-10-CM

## 2023-06-06 DIAGNOSIS — Z79.899 HIGH RISK MEDICATION USE: ICD-10-CM

## 2023-06-06 PROCEDURE — 3078F DIAST BP <80 MM HG: CPT | Performed by: INTERNAL MEDICINE

## 2023-06-06 PROCEDURE — 99215 OFFICE O/P EST HI 40 MIN: CPT | Performed by: INTERNAL MEDICINE

## 2023-06-06 PROCEDURE — 1036F TOBACCO NON-USER: CPT | Performed by: INTERNAL MEDICINE

## 2023-06-06 PROCEDURE — G8419 CALC BMI OUT NRM PARAM NOF/U: HCPCS | Performed by: INTERNAL MEDICINE

## 2023-06-06 PROCEDURE — 93005 ELECTROCARDIOGRAM TRACING: CPT | Performed by: INTERNAL MEDICINE

## 2023-06-06 PROCEDURE — 1123F ACP DISCUSS/DSCN MKR DOCD: CPT | Performed by: INTERNAL MEDICINE

## 2023-06-06 PROCEDURE — 93010 ELECTROCARDIOGRAM REPORT: CPT | Performed by: INTERNAL MEDICINE

## 2023-06-06 PROCEDURE — 3075F SYST BP GE 130 - 139MM HG: CPT | Performed by: INTERNAL MEDICINE

## 2023-06-06 PROCEDURE — G8427 DOCREV CUR MEDS BY ELIG CLIN: HCPCS | Performed by: INTERNAL MEDICINE

## 2023-06-06 RX ORDER — IVERMECTIN 10 MG/G
CREAM TOPICAL
COMMUNITY

## 2023-06-06 RX ORDER — DOXYCYCLINE HYCLATE 100 MG/1
CAPSULE ORAL
COMMUNITY

## 2023-06-06 ASSESSMENT — PATIENT HEALTH QUESTIONNAIRE - PHQ9
1. LITTLE INTEREST OR PLEASURE IN DOING THINGS: 0
SUM OF ALL RESPONSES TO PHQ QUESTIONS 1-9: 0
2. FEELING DOWN, DEPRESSED OR HOPELESS: 0
SUM OF ALL RESPONSES TO PHQ QUESTIONS 1-9: 0
SUM OF ALL RESPONSES TO PHQ9 QUESTIONS 1 & 2: 0
SUM OF ALL RESPONSES TO PHQ QUESTIONS 1-9: 0
SUM OF ALL RESPONSES TO PHQ QUESTIONS 1-9: 0

## 2023-07-25 LAB — HBA1C MFR BLD HPLC: 6 %

## 2023-07-31 ENCOUNTER — CLINICAL DOCUMENTATION (OUTPATIENT)
Age: 83
End: 2023-07-31

## 2023-07-31 NOTE — PROGRESS NOTES
Received faxed request for records from Dr. Trevin Meyer. Faxed all office notes, labs, EKG's and event monitor results to Dr. Trevin Meyer @ [phone], confirmation received.

## 2023-09-25 RX ORDER — AMIODARONE HYDROCHLORIDE 200 MG/1
100 TABLET ORAL EVERY OTHER DAY
Qty: 45 TABLET | Refills: 1 | Status: SHIPPED | OUTPATIENT
Start: 2023-09-25

## 2023-09-25 NOTE — TELEPHONE ENCOUNTER
Requested Prescriptions     Signed Prescriptions Disp Refills    amiodarone (CORDARONE) 200 MG tablet 45 tablet 1     Sig: Take 0.5 tablets by mouth every other day     Authorizing Provider: RON JEAN     Ordering User: Chaneta Olszewski K     Refills per verbal order from Dr. Jennifer Murray.   Last OV: 6/6/23  Next OV: 12/7/23

## 2023-11-01 DIAGNOSIS — I49.3 FREQUENT PVCS: ICD-10-CM

## 2023-11-01 DIAGNOSIS — I10 PRIMARY HYPERTENSION: ICD-10-CM

## 2023-11-01 DIAGNOSIS — E78.5 HYPERLIPIDEMIA, UNSPECIFIED HYPERLIPIDEMIA TYPE: ICD-10-CM

## 2023-11-01 DIAGNOSIS — Z79.899 HIGH RISK MEDICATION USE: ICD-10-CM

## 2023-11-01 DIAGNOSIS — J45.909 UNCOMPLICATED ASTHMA, UNSPECIFIED ASTHMA SEVERITY, UNSPECIFIED WHETHER PERSISTENT: ICD-10-CM

## 2023-11-02 LAB
ALBUMIN SERPL-MCNC: 4.1 G/DL (ref 3.5–5)
ALBUMIN/GLOB SERPL: 1.3 (ref 1.1–2.2)
ALP SERPL-CCNC: 62 U/L (ref 45–117)
ALT SERPL-CCNC: 52 U/L (ref 12–78)
AST SERPL-CCNC: 40 U/L (ref 15–37)
BILIRUB DIRECT SERPL-MCNC: 0.2 MG/DL (ref 0–0.2)
BILIRUB SERPL-MCNC: 0.5 MG/DL (ref 0.2–1)
GLOBULIN SER CALC-MCNC: 3.2 G/DL (ref 2–4)
PROT SERPL-MCNC: 7.3 G/DL (ref 6.4–8.2)
TSH SERPL DL<=0.05 MIU/L-ACNC: 2.04 UIU/ML (ref 0.36–3.74)

## 2023-12-06 NOTE — PROGRESS NOTES
Cardiac Electrophysiology Office Follow-up    NAME: Gavin Mckoy   :  1940  MRM:  [id number]    Date:  2023         Assessment and Plan:     1. Frequent PVCs  -     EKG 12 Lead  -     Hepatic Function Panel; Future  -     TSH; Future  2. PVC (premature ventricular contraction)  -     EKG 12 Lead  -     Hepatic Function Panel; Future  -     TSH; Future  3. Hyperlipidemia, unspecified hyperlipidemia type  -     EKG 12 Lead  -     Hepatic Function Panel; Future  -     TSH; Future  4. Primary hypertension  -     Hepatic Function Panel; Future  -     TSH; Future  5. High risk medication use  -     Hepatic Function Panel; Future  -     TSH; Future       Frequent PVCs  History of symptomatic frequent PVCs. Prior ventricular bigeminy and trigeminy with burden 12.2% in 2020. Now less than 1% on amiodarone. EKG today demonstrated no evidence of PVCs. - We spoke in great detail again regarding the theoretical risk of amiodarone use chronically versus the risk of ablation therapy. He has no functional limitations and appears to have good quality  life at this time. We trialed off of amiodarone due to history of photosensitivity but his PVC resumed. - Continue low-dose amiodarone  100 mg q. OD  - Repeat 48-hour Holter 23: PVCs burden of 1.8%, frequent PACs 14.3%, no AF/AFL  -EKG today demonstrated no evidence of PVCs. -  Updated TSH and LFTs on 23 independently reviewed by me were within normal limits (TSH of 2, LFTs normal with mildly elevated AST of 40) . CXR on 23 without any abnormalities. - Last echocardiogram from 2021: EF 55 to 60%, mild LVH, calcified  aortic valve with mild aortic stenosis, mild mitral and tricuspid regurgitation  -Obtain LFTs and TSH in 6 months  - Follow-up with me in 6 months    High risk med  management  - We discussed in great detail regarding high risk medication management and the associated risks of antiarrhythmic therapy.   Patient

## 2023-12-07 ENCOUNTER — OFFICE VISIT (OUTPATIENT)
Age: 83
End: 2023-12-07
Payer: MEDICARE

## 2023-12-07 VITALS
BODY MASS INDEX: 28.78 KG/M2 | OXYGEN SATURATION: 96 % | HEIGHT: 71 IN | DIASTOLIC BLOOD PRESSURE: 78 MMHG | SYSTOLIC BLOOD PRESSURE: 124 MMHG | HEART RATE: 74 BPM | WEIGHT: 205.6 LBS

## 2023-12-07 DIAGNOSIS — I49.3 FREQUENT PVCS: Primary | ICD-10-CM

## 2023-12-07 DIAGNOSIS — E78.5 HYPERLIPIDEMIA, UNSPECIFIED HYPERLIPIDEMIA TYPE: ICD-10-CM

## 2023-12-07 DIAGNOSIS — Z79.899 HIGH RISK MEDICATION USE: ICD-10-CM

## 2023-12-07 DIAGNOSIS — I10 PRIMARY HYPERTENSION: ICD-10-CM

## 2023-12-07 DIAGNOSIS — I49.3 PVC (PREMATURE VENTRICULAR CONTRACTION): ICD-10-CM

## 2023-12-07 PROCEDURE — 93005 ELECTROCARDIOGRAM TRACING: CPT | Performed by: INTERNAL MEDICINE

## 2023-12-07 PROCEDURE — 99215 OFFICE O/P EST HI 40 MIN: CPT | Performed by: INTERNAL MEDICINE

## 2023-12-07 RX ORDER — DILTIAZEM HYDROCHLORIDE 240 MG/1
240 CAPSULE, EXTENDED RELEASE ORAL DAILY
Qty: 30 CAPSULE | Refills: 5 | Status: SHIPPED | OUTPATIENT
Start: 2023-12-07

## 2023-12-07 ASSESSMENT — PATIENT HEALTH QUESTIONNAIRE - PHQ9
SUM OF ALL RESPONSES TO PHQ QUESTIONS 1-9: 0
SUM OF ALL RESPONSES TO PHQ9 QUESTIONS 1 & 2: 0
SUM OF ALL RESPONSES TO PHQ QUESTIONS 1-9: 0
SUM OF ALL RESPONSES TO PHQ QUESTIONS 1-9: 0
1. LITTLE INTEREST OR PLEASURE IN DOING THINGS: 0
2. FEELING DOWN, DEPRESSED OR HOPELESS: 0
SUM OF ALL RESPONSES TO PHQ QUESTIONS 1-9: 0

## 2023-12-07 NOTE — PATIENT INSTRUCTIONS
Increase Diltiazem to 240 mg daily  Goal BP (<130/90 mmHg), data suggests potentially <120/90 mmHg.   If your systolic blood pressure is <100 on several readings, I would back down to 180 mg daily  Obtain Liver Function Test and TSH in 6 months  FU with Dr. Ronak Smith in 6 months

## 2024-03-01 ENCOUNTER — TELEPHONE (OUTPATIENT)
Age: 84
End: 2024-03-01

## 2024-05-30 DIAGNOSIS — Z79.899 HIGH RISK MEDICATION USE: ICD-10-CM

## 2024-05-30 DIAGNOSIS — I49.3 FREQUENT PVCS: ICD-10-CM

## 2024-05-30 DIAGNOSIS — E78.5 HYPERLIPIDEMIA, UNSPECIFIED HYPERLIPIDEMIA TYPE: ICD-10-CM

## 2024-05-30 DIAGNOSIS — I10 PRIMARY HYPERTENSION: ICD-10-CM

## 2024-05-30 DIAGNOSIS — I49.3 PVC (PREMATURE VENTRICULAR CONTRACTION): ICD-10-CM

## 2024-05-30 LAB
ALBUMIN SERPL-MCNC: 3.8 G/DL (ref 3.5–5)
ALBUMIN/GLOB SERPL: 1.2 (ref 1.1–2.2)
ALP SERPL-CCNC: 55 U/L (ref 45–117)
ALT SERPL-CCNC: 33 U/L (ref 12–78)
AST SERPL-CCNC: 30 U/L (ref 15–37)
BILIRUB DIRECT SERPL-MCNC: 0.2 MG/DL (ref 0–0.2)
BILIRUB SERPL-MCNC: 0.7 MG/DL (ref 0.2–1)
GLOBULIN SER CALC-MCNC: 3.2 G/DL (ref 2–4)
PROT SERPL-MCNC: 7 G/DL (ref 6.4–8.2)
TSH SERPL DL<=0.05 MIU/L-ACNC: 3.14 UIU/ML (ref 0.36–3.74)

## 2024-06-03 RX ORDER — DILTIAZEM HYDROCHLORIDE 240 MG/1
240 CAPSULE, EXTENDED RELEASE ORAL DAILY
Qty: 90 CAPSULE | Refills: 3 | Status: SHIPPED | OUTPATIENT
Start: 2024-06-03

## 2024-06-03 NOTE — TELEPHONE ENCOUNTER
Requested Prescriptions     Signed Prescriptions Disp Refills    dilTIAZem (TIAZAC) 240 MG extended release capsule 90 capsule 3     Sig: Take 1 capsule by mouth daily     Authorizing Provider: PINKY ALBERTS     Ordering User: WILLA TUCKER     Refill per verbal order Dr. Pinky Alberts.   Last visit:12/7/23  Next visit: 8/1/24

## 2024-08-01 ENCOUNTER — OFFICE VISIT (OUTPATIENT)
Age: 84
End: 2024-08-01
Payer: MEDICARE

## 2024-08-01 VITALS
HEART RATE: 76 BPM | OXYGEN SATURATION: 96 % | DIASTOLIC BLOOD PRESSURE: 78 MMHG | HEIGHT: 71 IN | SYSTOLIC BLOOD PRESSURE: 126 MMHG | WEIGHT: 199.2 LBS | BODY MASS INDEX: 27.89 KG/M2

## 2024-08-01 DIAGNOSIS — I49.3 FREQUENT PVCS: Primary | ICD-10-CM

## 2024-08-01 DIAGNOSIS — I10 PRIMARY HYPERTENSION: ICD-10-CM

## 2024-08-01 DIAGNOSIS — E78.5 HYPERLIPIDEMIA, UNSPECIFIED HYPERLIPIDEMIA TYPE: ICD-10-CM

## 2024-08-01 DIAGNOSIS — Z79.899 HIGH RISK MEDICATION USE: ICD-10-CM

## 2024-08-01 PROCEDURE — 93010 ELECTROCARDIOGRAM REPORT: CPT | Performed by: INTERNAL MEDICINE

## 2024-08-01 PROCEDURE — 99214 OFFICE O/P EST MOD 30 MIN: CPT | Performed by: INTERNAL MEDICINE

## 2024-08-01 PROCEDURE — 93005 ELECTROCARDIOGRAM TRACING: CPT | Performed by: INTERNAL MEDICINE

## 2024-08-01 PROCEDURE — 3074F SYST BP LT 130 MM HG: CPT | Performed by: INTERNAL MEDICINE

## 2024-08-01 PROCEDURE — G8419 CALC BMI OUT NRM PARAM NOF/U: HCPCS | Performed by: INTERNAL MEDICINE

## 2024-08-01 PROCEDURE — 1123F ACP DISCUSS/DSCN MKR DOCD: CPT | Performed by: INTERNAL MEDICINE

## 2024-08-01 PROCEDURE — 1036F TOBACCO NON-USER: CPT | Performed by: INTERNAL MEDICINE

## 2024-08-01 PROCEDURE — G8427 DOCREV CUR MEDS BY ELIG CLIN: HCPCS | Performed by: INTERNAL MEDICINE

## 2024-08-01 PROCEDURE — 3078F DIAST BP <80 MM HG: CPT | Performed by: INTERNAL MEDICINE

## 2024-08-01 RX ORDER — DUPILUMAB 300 MG/2ML
INJECTION, SOLUTION SUBCUTANEOUS
COMMUNITY
Start: 2024-07-22

## 2024-08-01 ASSESSMENT — PATIENT HEALTH QUESTIONNAIRE - PHQ9
SUM OF ALL RESPONSES TO PHQ QUESTIONS 1-9: 0
2. FEELING DOWN, DEPRESSED OR HOPELESS: NOT AT ALL
SUM OF ALL RESPONSES TO PHQ QUESTIONS 1-9: 0
SUM OF ALL RESPONSES TO PHQ QUESTIONS 1-9: 0
SUM OF ALL RESPONSES TO PHQ9 QUESTIONS 1 & 2: 0
SUM OF ALL RESPONSES TO PHQ QUESTIONS 1-9: 0
SUM OF ALL RESPONSES TO PHQ QUESTIONS 1-9: 0
SUM OF ALL RESPONSES TO PHQ9 QUESTIONS 1 & 2: 0
2. FEELING DOWN, DEPRESSED OR HOPELESS: NOT AT ALL
SUM OF ALL RESPONSES TO PHQ QUESTIONS 1-9: 0
1. LITTLE INTEREST OR PLEASURE IN DOING THINGS: NOT AT ALL
SUM OF ALL RESPONSES TO PHQ QUESTIONS 1-9: 0
1. LITTLE INTEREST OR PLEASURE IN DOING THINGS: NOT AT ALL
SUM OF ALL RESPONSES TO PHQ QUESTIONS 1-9: 0

## 2024-08-01 NOTE — PROGRESS NOTES
well as pertinent prior admission documents.      Review of Systems:   12 point review of systems was performed. All negative except for HPI    Exam:     Physical Exam:  /78 (Site: Left Upper Arm, Position: Sitting, Cuff Size: Medium Adult)   Pulse 76   Ht 1.803 m (5' 11\")   Wt 90.4 kg (199 lb 3.2 oz)   SpO2 96%   BMI 27.78 kg/m²      PHYSICAL EXAM  General:  Alert and oriented, in no acute distress  Head:  Atraumatic, normocephalic  Eyes:  extraocular muscles intact  Neck:  Supple, normal range of motion  Lungs:  Clear to auscultation bilaterally, no wheezes/rales/rhonchi   Cardiovascular:  Regular rate and rhythm, normal S1-S2, no murmurs/rubs/gallops  Abdomen:  Soft, nontender, nondistended, normoactive bowel sounds  Skin:  Intact, no rash  Extremities:, no clubbing, cyanosis, or edema  Musculoskeletal: normal range of motion  Neurological:  Alert and oriented, no focal neurologic deficits  Psychiatric:  Normal mood and affect    EKG: Sinus, rate of 69 bpm, normal axis, Qtc 416. ms      Medications:     Current Outpatient Medications   Medication Sig    DUPIXENT 300 MG/2ML SOSY injection     hydrocortisone 2.5 % cream APPLY TWICE DAILY TO RASH ON FACE FOR UP TO 2 WEEKS AT A TIME    dilTIAZem (TIAZAC) 240 MG extended release capsule Take 1 capsule by mouth daily    metroNIDAZOLE (METROCREAM) 0.75 % cream Apply topically daily Apply topically 2 times daily.    Omega-3 Fatty Acids ([employer] FISH OIL PO) Take by mouth daily    amiodarone (CORDARONE) 200 MG tablet Take 0.5 tablets by mouth every other day    Ivermectin 1 % CREA ivermectin 1 % topical cream    Multiple Vitamin (MULTIVITAMIN PO) Take by mouth    atorvastatin (LIPITOR) 10 MG tablet ceived the following from [facility]: Outside name: atorvastatin (LIPITOR) 10 mg tablet    celecoxib (CELEBREX) 200 MG capsule Take 1 capsule by mouth daily    doxazosin (CARDURA) 8 MG tablet ceived the following from [facility]: Outside

## 2024-08-01 NOTE — PATIENT INSTRUCTIONS
Diltiazem can cause low HR and lower extremities swelling, I would discuss with your PCP regarding the role of decreasing it and starting a new antihypertensive.  Discuss with your pulmonologist regarding an updated pulmonary function test (PFTs) because you're on Amiodarone  Please obtain new blood pressure cuff  Obtain LFTs and TSH in 12/24  FU with Dr. Alberts in 6 months

## 2024-08-27 ENCOUNTER — HOSPITAL ENCOUNTER (OUTPATIENT)
Facility: HOSPITAL | Age: 84
Discharge: HOME | End: 2024-08-29
Attending: INTERNAL MEDICINE
Payer: MEDICARE

## 2024-08-27 DIAGNOSIS — I65.29 OCCLUSION AND STENOSIS OF UNSPECIFIED CAROTID ARTERY: ICD-10-CM

## 2024-08-27 PROCEDURE — 93880 EXTRACRANIAL BILAT STUDY: CPT

## 2024-08-28 LAB
VAS LEFT CCA DIST EDV: 13.6 CM/S
VAS LEFT CCA DIST PSV: 54.6 CM/S
VAS LEFT CCA PROX EDV: 16 CM/S
VAS LEFT CCA PROX PSV: 94.1 CM/S
VAS LEFT ECA EDV: 5.4 CM/S
VAS LEFT ECA PSV: 43.1 CM/S
VAS LEFT ICA DIST EDV: 23 CM/S
VAS LEFT ICA DIST PSV: 56.8 CM/S
VAS LEFT ICA MID EDV: 9.7 CM/S
VAS LEFT ICA MID PSV: 50.3 CM/S
VAS LEFT ICA PROX EDV: 13.9 CM/S
VAS LEFT ICA PROX PSV: 41.1 CM/S
VAS LEFT ICA/CCA PSV: 1 NO UNITS
VAS LEFT VERTEBRAL EDV: 8.1 CM/S
VAS LEFT VERTEBRAL PSV: 42.4 CM/S
VAS RIGHT CCA DIST EDV: 11.2 CM/S
VAS RIGHT CCA DIST PSV: 40.5 CM/S
VAS RIGHT CCA PROX EDV: 16.3 CM/S
VAS RIGHT CCA PROX PSV: 74 CM/S
VAS RIGHT ECA EDV: 3.7 CM/S
VAS RIGHT ECA PSV: 37.9 CM/S
VAS RIGHT ICA DIST EDV: 17.6 CM/S
VAS RIGHT ICA DIST PSV: 58.5 CM/S
VAS RIGHT ICA MID EDV: 15.4 CM/S
VAS RIGHT ICA MID PSV: 47.4 CM/S
VAS RIGHT ICA PROX EDV: 16.1 CM/S
VAS RIGHT ICA PROX PSV: 53.8 CM/S
VAS RIGHT ICA/CCA PSV: 1.4 NO UNITS
VAS RIGHT VERTEBRAL EDV: 13.6 CM/S
VAS RIGHT VERTEBRAL PSV: 67.7 CM/S

## 2024-09-12 ENCOUNTER — HOSPITAL ENCOUNTER (OUTPATIENT)
Dept: VASCULAR SURGERY | Facility: HOSPITAL | Age: 84
Discharge: HOME | End: 2024-09-14
Attending: INTERNAL MEDICINE
Payer: MEDICARE

## 2024-09-12 DIAGNOSIS — I10 ESSENTIAL (PRIMARY) HYPERTENSION: ICD-10-CM

## 2024-09-12 LAB
VAS AORTA DIST AP: 1.58 CM
VAS AORTA DIST TR: 2.34 CM
VAS AORTA MID AP: 1.73 CM
VAS AORTA MID PSV: 88 CM/S
VAS AORTA MID TRANS: 2.39 CM
VAS AORTA PROX AP: 1.61 CM
VAS AORTA PROX PSV: 102.6 CM/S
VAS CELIAC EDV: 43.1 CM/S
VAS CELIAC PSV: 200.8 CM/S
VAS LEFT KIDNEY LENGTH: 11.28 CM
VAS LEFT KIDNEY WIDTH: 5.87 CM
VAS LEFT RENAL DIST EDV: 12.6 CM/S
VAS LEFT RENAL DIST PSV: 54.6 CM/S
VAS LEFT RENAL DIST RAR: 0.62
VAS LEFT RENAL DIST RI: 0.77 NO UNITS
VAS LEFT RENAL LOWER PARENCHYMA EDV: 16.2 CM/S
VAS LEFT RENAL LOWER PARENCHYMA PSV: 63.7 CM/S
VAS LEFT RENAL LOWER PARENCHYMA RI: 0.75
VAS LEFT RENAL MID EDV: 18.1 CM/S
VAS LEFT RENAL MID PSV: 85.6 CM/S
VAS LEFT RENAL MID RAR: 0.97
VAS LEFT RENAL MID RI: 0.79 NO UNITS
VAS LEFT RENAL MIDDLE PARENCHYMA EDV: 18.1 CM/S
VAS LEFT RENAL MIDDLE PARENCHYMA PSV: 49.1 CM/S
VAS LEFT RENAL MIDDLE PARENCHYMA RI: 0.63
VAS LEFT RENAL PROX EDV: 27.2 CM/S
VAS LEFT RENAL PROX PSV: 94.7 CM/S
VAS LEFT RENAL PROX RAR: 1.08
VAS LEFT RENAL PROX RI: 0.71 NO UNITS
VAS LEFT RENAL RAR: 1.08
VAS LEFT RENAL UPPER PARENCHYMA EDV: 23.6 CM/S
VAS LEFT RENAL UPPER PARENCHYMA PSV: 102.1 CM/S
VAS LEFT RENAL UPPER PARENCHYMA RI: 0.77
VAS PROX SMA EDV: 23.6 CM/S
VAS PROX SMA PSV: 142.3 CM/S
VAS RIGHT KIDNEY LENGTH: 12.93 CM
VAS RIGHT KIDNEY WIDTH: 3.98 CM
VAS RIGHT RENAL LOWER PARENCHYMA EDV: 29 CM/S
VAS RIGHT RENAL LOWER PARENCHYMA PSV: 109.4 CM/S
VAS RIGHT RENAL LOWER PARENCHYMA RI: 0.73
VAS RIGHT RENAL UPPER PARENCHYMA EDV: 19.9 CM/S
VAS RIGHT RENAL UPPER PARENCHYMA PSV: 61.9 CM/S
VAS RIGHT RENAL UPPER PARENCHYMA RI: 0.68

## 2024-09-12 PROCEDURE — 93975 VASCULAR STUDY: CPT

## 2024-09-12 RX ORDER — AMIODARONE HYDROCHLORIDE 200 MG/1
100 TABLET ORAL EVERY OTHER DAY
Qty: 45 TABLET | Refills: 0 | Status: SHIPPED | OUTPATIENT
Start: 2024-09-12

## 2024-09-25 ENCOUNTER — HOSPITAL ENCOUNTER (OUTPATIENT)
Age: 84
Discharge: HOME | End: 2024-09-28
Payer: MEDICARE

## 2024-09-25 DIAGNOSIS — M85.88 OTHER SPECIFIED DISORDERS OF BONE DENSITY AND STRUCTURE, OTHER SITE: ICD-10-CM

## 2024-09-25 PROCEDURE — 77080 DXA BONE DENSITY AXIAL: CPT

## 2024-12-04 DIAGNOSIS — E78.5 HYPERLIPIDEMIA, UNSPECIFIED HYPERLIPIDEMIA TYPE: ICD-10-CM

## 2024-12-04 DIAGNOSIS — I49.3 FREQUENT PVCS: ICD-10-CM

## 2024-12-04 DIAGNOSIS — I10 PRIMARY HYPERTENSION: ICD-10-CM

## 2024-12-04 DIAGNOSIS — Z79.899 HIGH RISK MEDICATION USE: ICD-10-CM

## 2024-12-05 LAB
ALBUMIN SERPL-MCNC: 4.2 G/DL (ref 3.5–5)
ALBUMIN/GLOB SERPL: 1.4 (ref 1.1–2.2)
ALP SERPL-CCNC: 52 U/L (ref 45–117)
ALT SERPL-CCNC: 36 U/L (ref 12–78)
AST SERPL-CCNC: 28 U/L (ref 15–37)
BILIRUB DIRECT SERPL-MCNC: 0.2 MG/DL (ref 0–0.2)
BILIRUB SERPL-MCNC: 0.7 MG/DL (ref 0.2–1)
GLOBULIN SER CALC-MCNC: 2.9 G/DL (ref 2–4)
PROT SERPL-MCNC: 7.1 G/DL (ref 6.4–8.2)
TSH SERPL DL<=0.05 MIU/L-ACNC: 3.35 UIU/ML (ref 0.36–3.74)

## 2024-12-17 ENCOUNTER — TELEPHONE (OUTPATIENT)
Age: 84
End: 2024-12-17

## 2024-12-17 NOTE — TELEPHONE ENCOUNTER
LVM for pt. To call and rescheduled his cancelled 03/11 appointment.    Please reschedule to the 1st available.

## 2025-05-28 RX ORDER — AMIODARONE HYDROCHLORIDE 200 MG/1
100 TABLET ORAL EVERY OTHER DAY
Qty: 30 TABLET | Refills: 0 | Status: SHIPPED | OUTPATIENT
Start: 2025-05-28

## 2025-05-28 NOTE — TELEPHONE ENCOUNTER
Ordered Per  Verbal Order.     Last appt: Visit date not found   Future Appointments   Date Time Provider Department Center   6/19/2025  8:00 AM Pinky Alberts, MD [facility]       Requested Prescriptions     Pending Prescriptions Disp Refills    amiodarone (CORDARONE) 200 MG tablet 30 tablet 0     Sig: Take 0.5 tablets by mouth every other day     Has follow up 06/19/2025    Prior labs and Blood pressures:  BP Readings from Last 3 Encounters:   08/01/24 126/78   12/07/23 124/78   06/06/23 138/70           
Home

## 2025-06-19 ENCOUNTER — OFFICE VISIT (OUTPATIENT)
Age: 85
End: 2025-06-19
Payer: MEDICARE

## 2025-06-19 VITALS
DIASTOLIC BLOOD PRESSURE: 62 MMHG | RESPIRATION RATE: 16 BRPM | SYSTOLIC BLOOD PRESSURE: 128 MMHG | HEIGHT: 71 IN | BODY MASS INDEX: 28.17 KG/M2 | OXYGEN SATURATION: 99 % | WEIGHT: 201.2 LBS | HEART RATE: 70 BPM

## 2025-06-19 DIAGNOSIS — Z79.899 HIGH RISK MEDICATION USE: ICD-10-CM

## 2025-06-19 DIAGNOSIS — I49.3 FREQUENT PVCS: Primary | ICD-10-CM

## 2025-06-19 DIAGNOSIS — I49.3 FREQUENT PVCS: ICD-10-CM

## 2025-06-19 DIAGNOSIS — E78.5 HYPERLIPIDEMIA, UNSPECIFIED HYPERLIPIDEMIA TYPE: ICD-10-CM

## 2025-06-19 DIAGNOSIS — I10 PRIMARY HYPERTENSION: ICD-10-CM

## 2025-06-19 PROCEDURE — 99214 OFFICE O/P EST MOD 30 MIN: CPT | Performed by: INTERNAL MEDICINE

## 2025-06-19 PROCEDURE — 93010 ELECTROCARDIOGRAM REPORT: CPT | Performed by: INTERNAL MEDICINE

## 2025-06-19 PROCEDURE — G8427 DOCREV CUR MEDS BY ELIG CLIN: HCPCS | Performed by: INTERNAL MEDICINE

## 2025-06-19 PROCEDURE — 1036F TOBACCO NON-USER: CPT | Performed by: INTERNAL MEDICINE

## 2025-06-19 PROCEDURE — 1123F ACP DISCUSS/DSCN MKR DOCD: CPT | Performed by: INTERNAL MEDICINE

## 2025-06-19 PROCEDURE — 93005 ELECTROCARDIOGRAM TRACING: CPT | Performed by: INTERNAL MEDICINE

## 2025-06-19 PROCEDURE — 3074F SYST BP LT 130 MM HG: CPT | Performed by: INTERNAL MEDICINE

## 2025-06-19 PROCEDURE — G8419 CALC BMI OUT NRM PARAM NOF/U: HCPCS | Performed by: INTERNAL MEDICINE

## 2025-06-19 PROCEDURE — 3078F DIAST BP <80 MM HG: CPT | Performed by: INTERNAL MEDICINE

## 2025-06-19 PROCEDURE — 1160F RVW MEDS BY RX/DR IN RCRD: CPT | Performed by: INTERNAL MEDICINE

## 2025-06-19 PROCEDURE — 1159F MED LIST DOCD IN RCRD: CPT | Performed by: INTERNAL MEDICINE

## 2025-06-19 PROCEDURE — G2211 COMPLEX E/M VISIT ADD ON: HCPCS | Performed by: INTERNAL MEDICINE

## 2025-06-19 RX ORDER — AMLODIPINE BESYLATE 5 MG/1
5 TABLET ORAL DAILY
COMMUNITY

## 2025-06-19 RX ORDER — FAMOTIDINE 20 MG/1
20 TABLET, FILM COATED ORAL 2 TIMES DAILY
COMMUNITY

## 2025-06-19 RX ORDER — BUMETANIDE 1 MG/1
1 TABLET ORAL DAILY
COMMUNITY

## 2025-06-19 NOTE — PROGRESS NOTES
Room #: 8    Chief Complaint   Patient presents with    Other     PVC       Vitals:    06/19/25 0757   BP: 128/62   BP Site: Right Upper Arm   Patient Position: Sitting   BP Cuff Size: Medium Adult   Pulse: 70   Resp: 16   SpO2: 99%   Weight: 91.3 kg (201 lb 3.2 oz)   Height: 1.803 m (5' 11\")         Chest pain:  NO    Have you been to the ER, urgent care, or hospitalized outside of [facility] since your last visit?   NO    Refills:  NO

## 2025-06-19 NOTE — PROGRESS NOTES
Cardiac Electrophysiology Office Follow-up    NAME: Rajiv Junior   :  1940  MRM:  [id number]    Date:  2025         Assessment and Plan:     1. Frequent PVCs  -     EKG 12 Lead  -     Echo (TTE) complete (PRN contrast/bubble/strain/3D); Future  -     TSH; Future  -     T4, Free; Future  -     Hepatic Function Panel; Future  2. Primary hypertension  -     EKG 12 Lead  -     Echo (TTE) complete (PRN contrast/bubble/strain/3D); Future  -     TSH; Future  -     T4, Free; Future  -     Hepatic Function Panel; Future  3. Hyperlipidemia, unspecified hyperlipidemia type  -     EKG 12 Lead  -     Echo (TTE) complete (PRN contrast/bubble/strain/3D); Future  -     TSH; Future  -     T4, Free; Future  -     Hepatic Function Panel; Future  4. High risk medication use  -     EKG 12 Lead  -     Echo (TTE) complete (PRN contrast/bubble/strain/3D); Future  -     TSH; Future  -     T4, Free; Future  -     Hepatic Function Panel; Future         Frequent PVCs  History of symptomatic frequent PVCs.  Prior ventricular bigeminy and trigeminy with burden 12.2% in 2020.  Now less than 1% on amiodarone.  EKG today demonstrated no evidence of PVCs.   - We spoke in great detail again regarding the theoretical risk of amiodarone use chronically versus the risk of ablation therapy.  He has no functional limitations and appears to have good quality  life at this time.  We trialed off of amiodarone due to history of photosensitivity but his PVC resumed.  - Continue low-dose amiodarone  100 mg q. OD  - Repeat 48-hour Holter 23: PVCs burden of 1.8%, frequent PACs 14.3%, no AF/AFL  -EKG today demonstrated no evidence of PVCs.  -  Updated TSH and LFTs on 24 independently reviewed by me were within normal limits (TSH of 3.35, LFTs normal) . CXR on 23 without any abnormalities.   - Last echocardiogram from 2021: EF 55 to 60%, mild LVH, calcified  aortic valve with mild aortic stenosis, mild mitral and

## 2025-06-19 NOTE — PATIENT INSTRUCTIONS
Obtain echocardiogram next available  Check Liver function test and TSH and free T4  FU with Dr. Alberts in 6 months

## 2025-06-20 LAB
ALBUMIN SERPL-MCNC: 4.1 G/DL (ref 3.5–5)
ALBUMIN/GLOB SERPL: 1.2 (ref 1.1–2.2)
ALP SERPL-CCNC: 62 U/L (ref 45–117)
ALT SERPL-CCNC: 40 U/L (ref 12–78)
AST SERPL-CCNC: 28 U/L (ref 15–37)
BILIRUB DIRECT SERPL-MCNC: 0.3 MG/DL (ref 0–0.2)
BILIRUB SERPL-MCNC: 0.9 MG/DL (ref 0.2–1)
GLOBULIN SER CALC-MCNC: 3.4 G/DL (ref 2–4)
PROT SERPL-MCNC: 7.5 G/DL (ref 6.4–8.2)
T4 FREE SERPL-MCNC: 1.1 NG/DL (ref 0.8–1.5)
TSH SERPL DL<=0.05 MIU/L-ACNC: 4.35 UIU/ML (ref 0.36–3.74)

## 2025-06-23 ENCOUNTER — ANCILLARY PROCEDURE (OUTPATIENT)
Age: 85
End: 2025-06-23
Payer: MEDICARE

## 2025-06-23 VITALS
HEIGHT: 71 IN | WEIGHT: 201 LBS | BODY MASS INDEX: 28.14 KG/M2 | DIASTOLIC BLOOD PRESSURE: 62 MMHG | SYSTOLIC BLOOD PRESSURE: 128 MMHG

## 2025-06-23 DIAGNOSIS — I10 PRIMARY HYPERTENSION: ICD-10-CM

## 2025-06-23 DIAGNOSIS — Z79.899 HIGH RISK MEDICATION USE: ICD-10-CM

## 2025-06-23 DIAGNOSIS — I49.3 FREQUENT PVCS: ICD-10-CM

## 2025-06-23 DIAGNOSIS — E78.5 HYPERLIPIDEMIA, UNSPECIFIED HYPERLIPIDEMIA TYPE: ICD-10-CM

## 2025-06-23 PROCEDURE — 93306 TTE W/DOPPLER COMPLETE: CPT | Performed by: INTERNAL MEDICINE

## 2025-06-24 ENCOUNTER — RESULTS FOLLOW-UP (OUTPATIENT)
Age: 85
End: 2025-06-24

## 2025-06-24 LAB
ECHO AO ROOT DIAM: 3.2 CM
ECHO AO ROOT INDEX: 1.52 CM/M2
ECHO AV AREA PEAK VELOCITY: 1.5 CM2
ECHO AV AREA VTI: 1.7 CM2
ECHO AV AREA/BSA PEAK VELOCITY: 0.7 CM2/M2
ECHO AV AREA/BSA VTI: 0.8 CM2/M2
ECHO AV MEAN GRADIENT: 10 MMHG
ECHO AV MEAN VELOCITY: 1.5 M/S
ECHO AV PEAK GRADIENT: 18 MMHG
ECHO AV PEAK VELOCITY: 2.1 M/S
ECHO AV VELOCITY RATIO: 0.38
ECHO AV VTI: 39.8 CM
ECHO BSA: 2.14 M2
ECHO EST RA PRESSURE: 8 MMHG
ECHO LA DIAMETER INDEX: 2.27 CM/M2
ECHO LA DIAMETER: 4.8 CM
ECHO LA TO AORTIC ROOT RATIO: 1.5
ECHO LA VOL A-L A2C: 180 ML (ref 18–58)
ECHO LA VOL A-L A4C: 114 ML (ref 18–58)
ECHO LA VOL BP: 140 ML (ref 18–58)
ECHO LA VOL MOD A2C: 173 ML (ref 18–58)
ECHO LA VOL MOD A4C: 109 ML (ref 18–58)
ECHO LA VOL/BSA BIPLANE: 66 ML/M2 (ref 16–34)
ECHO LA VOLUME AREA LENGTH: 146 ML
ECHO LA VOLUME INDEX A-L A2C: 85 ML/M2 (ref 16–34)
ECHO LA VOLUME INDEX A-L A4C: 54 ML/M2 (ref 16–34)
ECHO LA VOLUME INDEX AREA LENGTH: 69 ML/M2 (ref 16–34)
ECHO LA VOLUME INDEX MOD A2C: 82 ML/M2 (ref 16–34)
ECHO LA VOLUME INDEX MOD A4C: 52 ML/M2 (ref 16–34)
ECHO LV E' LATERAL VELOCITY: 12.37 CM/S
ECHO LV E' SEPTAL VELOCITY: 10.58 CM/S
ECHO LV EDV A2C: 57 ML
ECHO LV EDV A4C: 94 ML
ECHO LV EDV BP: 75 ML (ref 67–155)
ECHO LV EDV INDEX A4C: 45 ML/M2
ECHO LV EDV INDEX BP: 36 ML/M2
ECHO LV EDV NDEX A2C: 27 ML/M2
ECHO LV EF PHYSICIAN: 55 %
ECHO LV EJECTION FRACTION A2C: 42 %
ECHO LV EJECTION FRACTION A4C: 51 %
ECHO LV ESV A2C: 33 ML
ECHO LV ESV A4C: 46 ML
ECHO LV ESV BP: 40 ML (ref 22–58)
ECHO LV ESV INDEX A2C: 16 ML/M2
ECHO LV ESV INDEX A4C: 22 ML/M2
ECHO LV ESV INDEX BP: 19 ML/M2
ECHO LV FRACTIONAL SHORTENING: 33 % (ref 28–44)
ECHO LV INTERNAL DIMENSION DIASTOLE INDEX: 2.42 CM/M2
ECHO LV INTERNAL DIMENSION DIASTOLIC: 5.1 CM (ref 4.2–5.9)
ECHO LV INTERNAL DIMENSION SYSTOLIC INDEX: 1.61 CM/M2
ECHO LV INTERNAL DIMENSION SYSTOLIC: 3.4 CM
ECHO LV IVSD: 1.2 CM (ref 0.6–1)
ECHO LV MASS 2D: 213.9 G (ref 88–224)
ECHO LV MASS INDEX 2D: 101.4 G/M2 (ref 49–115)
ECHO LV POSTERIOR WALL DIASTOLIC: 1 CM (ref 0.6–1)
ECHO LV RELATIVE WALL THICKNESS RATIO: 0.39
ECHO LVOT AREA: 4.2 CM2
ECHO LVOT AV VTI INDEX: 0.42
ECHO LVOT DIAM: 2.3 CM
ECHO LVOT MEAN GRADIENT: 1 MMHG
ECHO LVOT PEAK GRADIENT: 2 MMHG
ECHO LVOT PEAK VELOCITY: 0.8 M/S
ECHO LVOT STROKE VOLUME INDEX: 32.9 ML/M2
ECHO LVOT SV: 69.3 ML
ECHO LVOT VTI: 16.7 CM
ECHO MV A VELOCITY: 0.4 M/S
ECHO MV AREA VTI: 2.6 CM2
ECHO MV E DECELERATION TIME (DT): 271.9 MS
ECHO MV E VELOCITY: 1.19 M/S
ECHO MV E/A RATIO: 2.98
ECHO MV E/E' LATERAL: 9.62
ECHO MV E/E' RATIO (AVERAGED): 10.43
ECHO MV E/E' SEPTAL: 11.25
ECHO MV LVOT VTI INDEX: 1.62
ECHO MV MAX VELOCITY: 1.2 M/S
ECHO MV MEAN GRADIENT: 3 MMHG
ECHO MV MEAN VELOCITY: 0.7 M/S
ECHO MV PEAK GRADIENT: 6 MMHG
ECHO MV VTI: 27 CM
ECHO PV MAX VELOCITY: 1.2 M/S
ECHO PV PEAK GRADIENT: 5 MMHG
ECHO RA END SYSTOLIC VOLUME APICAL 4 CHAMBER INDEX BSA: 45 ML/M2
ECHO RA VOLUME: 94 ML
ECHO RIGHT VENTRICULAR SYSTOLIC PRESSURE (RVSP): 38 MMHG
ECHO RV BASAL DIMENSION: 4.9 CM
ECHO RV FREE WALL PEAK S': 17 CM/S
ECHO RV TAPSE: 3 CM (ref 1.7–?)
ECHO RVOT PEAK GRADIENT: 2 MMHG
ECHO RVOT PEAK VELOCITY: 0.7 M/S
ECHO TV REGURGITANT MAX VELOCITY: 2.72 M/S
ECHO TV REGURGITANT PEAK GRADIENT: 30 MMHG

## 2025-06-24 PROCEDURE — 93306 TTE W/DOPPLER COMPLETE: CPT | Performed by: INTERNAL MEDICINE

## 2025-09-04 ENCOUNTER — CLINICAL DOCUMENTATION (OUTPATIENT)
Age: 85
End: 2025-09-04

## (undated) DEVICE — BAG SPEC BIOHZD LF 2MIL 6X10IN -- CONVERT TO ITEM 357326

## (undated) DEVICE — CONTAINER SPEC 20 ML LID NEUT BUFF FORMALIN 10 % POLYPR STS

## (undated) DEVICE — BAG BELONG PT PERS CLEAR HANDL

## (undated) DEVICE — CANN NASAL O2 CAPNOGRAPHY AD -- FILTERLINE

## (undated) DEVICE — NEEDLE HYPO 18GA L1.5IN PNK S STL HUB POLYPR SHLD REG BVL

## (undated) DEVICE — SYR ASSEMB INFL BLLN 60ML --

## (undated) DEVICE — Device

## (undated) DEVICE — SOLIDIFIER FLUID 3000 CC ABSORB

## (undated) DEVICE — SYRINGE MED 20ML STD CLR PLAS LUERLOCK TIP N CTRL DISP

## (undated) DEVICE — CATH IV AUTOGRD BC BLU 22GA 25 -- INSYTE

## (undated) DEVICE — SET EXTN TBNG L BOR 4 W STPCOCK ST 32IN PRIMING VOL 6ML

## (undated) DEVICE — KIT IV STRT W CHLORAPREP PD 1ML

## (undated) DEVICE — NEONATAL-ADULT SPO2 SENSOR: Brand: NELLCOR

## (undated) DEVICE — FORCEPS BX L240CM JAW DIA2.8MM L CAP W/ NDL MIC MESH TOOTH

## (undated) DEVICE — BW-412T DISP COMBO CLEANING BRUSH: Brand: SINGLE USE COMBINATION CLEANING BRUSH

## (undated) DEVICE — ESOPHAGEAL BALLOON DILATATION CATHETER: Brand: CRE FIXED WIRE

## (undated) DEVICE — ENDO CARRY-ON PROCEDURE KIT INCLUDES ENZYMATIC SPONGE, GAUZE, BIOHAZARD LABEL, TRAY, LUBRICANT, DIRTY SCOPE LABEL, WATER LABEL, TRAY, DRAWSTRING PAD, AND DEFENDO 4-PIECE KIT.: Brand: ENDO CARRY-ON PROCEDURE KIT

## (undated) DEVICE — 1200 GUARD II KIT W/5MM TUBE W/O VAC TUBE: Brand: GUARDIAN

## (undated) DEVICE — SET ADMIN 16ML TBNG L100IN 2 Y INJ SITE IV PIGGY BK DISP

## (undated) DEVICE — KENDALL RADIOLUCENT FOAM MONITORING ELECTRODE -RECTANGULAR SHAPE: Brand: KENDALL